# Patient Record
Sex: MALE | Race: WHITE | URBAN - METROPOLITAN AREA
[De-identification: names, ages, dates, MRNs, and addresses within clinical notes are randomized per-mention and may not be internally consistent; named-entity substitution may affect disease eponyms.]

---

## 2017-10-12 ENCOUNTER — HISTORICAL (OUTPATIENT)
Dept: ADMINISTRATIVE | Facility: HOSPITAL | Age: 82
End: 2017-10-12

## 2017-10-12 LAB
ABS NEUT (OLG): 3.73 X10(3)/MCL (ref 2.1–9.2)
ALBUMIN SERPL-MCNC: 2.1 GM/DL (ref 3.4–5)
ALBUMIN/GLOB SERPL: 1 {RATIO}
ALP SERPL-CCNC: 87 UNIT/L (ref 45–117)
ALT SERPL-CCNC: 40 UNIT/L (ref 16–61)
AST SERPL-CCNC: 41 UNIT/L (ref 15–37)
BASOPHILS # BLD AUTO: 0.02 X10(3)/MCL (ref 0–0.2)
BASOPHILS NFR BLD AUTO: 0.4 % (ref 0–0.9)
BILIRUB SERPL-MCNC: 2.1 MG/DL (ref 0.2–1)
BILIRUBIN DIRECT+TOT PNL SERPL-MCNC: 0.8 MG/DL (ref 0–1)
BILIRUBIN DIRECT+TOT PNL SERPL-MCNC: 1.3 MG/DL (ref 0–0.2)
BUN SERPL-MCNC: 20 MG/DL (ref 7–18)
CALCIUM SERPL-MCNC: 8.1 MG/DL (ref 8.5–10.1)
CHLORIDE SERPL-SCNC: 103 MMOL/L (ref 98–107)
CO2 SERPL-SCNC: 30 MMOL/L (ref 21–32)
CREAT SERPL-MCNC: 0.72 MG/DL (ref 0.7–1.3)
EOSINOPHIL # BLD AUTO: 0.07 X10(3)/MCL (ref 0–0.9)
EOSINOPHIL NFR BLD AUTO: 1.3 % (ref 0–6.5)
ERYTHROCYTE [DISTWIDTH] IN BLOOD BY AUTOMATED COUNT: 14.5 % (ref 11.5–17)
GLOBULIN SER-MCNC: 4 GM/DL (ref 2–4)
GLUCOSE SERPL-MCNC: 156 MG/DL (ref 74–106)
HCT VFR BLD AUTO: 32.2 % (ref 42–52)
HGB BLD-MCNC: 10.8 GM/DL (ref 14–18)
IMM GRANULOCYTES # BLD AUTO: 0.04 10*3/UL (ref 0–0.02)
IMM GRANULOCYTES NFR BLD AUTO: 0.7 % (ref 0–0.43)
LYMPHOCYTES # BLD AUTO: 1.02 X10(3)/MCL (ref 0.6–4.6)
LYMPHOCYTES NFR BLD AUTO: 18.4 % (ref 16.2–38.3)
MCH RBC QN AUTO: 32.6 PG (ref 27–31)
MCHC RBC AUTO-ENTMCNC: 33.5 GM/DL (ref 33–36)
MCV RBC AUTO: 97.3 FL (ref 80–94)
MONOCYTES # BLD AUTO: 0.67 X10(3)/MCL (ref 0.1–1.3)
MONOCYTES NFR BLD AUTO: 12.1 % (ref 4.7–11.3)
NEUTROPHILS # BLD AUTO: 3.73 X10(3)/MCL (ref 2.1–9.2)
NEUTROPHILS NFR BLD AUTO: 67.1 % (ref 49.1–73.4)
NRBC BLD AUTO-RTO: 0 % (ref 0–0.2)
PLATELET # BLD AUTO: 190 X10(3)/MCL (ref 130–400)
PMV BLD AUTO: 11.4 FL (ref 7.4–10.4)
POTASSIUM SERPL-SCNC: 3.4 MMOL/L (ref 3.5–5.1)
PREALB SERPL-MCNC: 8.5 MG/DL (ref 20–40)
PROT SERPL-MCNC: 6 GM/DL (ref 6.4–8.2)
RBC # BLD AUTO: 3.31 X10(6)/MCL (ref 4.7–6.1)
SODIUM SERPL-SCNC: 141 MMOL/L (ref 136–145)
WBC # SPEC AUTO: 5.6 X10(3)/MCL (ref 4.5–11.5)

## 2017-10-13 LAB
INR PPP: 1.3
PROTHROMBIN TIME: 13.8 SECOND(S) (ref 9.8–12)

## 2017-10-14 LAB
INR PPP: 1.6
PROTHROMBIN TIME: 16.3 SECOND(S) (ref 9.8–12)

## 2017-10-15 LAB
INR PPP: 2.2
PROTHROMBIN TIME: 22.5 SECOND(S) (ref 9.8–12)

## 2017-10-16 LAB
ABS NEUT (OLG): 4.23 X10(3)/MCL (ref 2.1–9.2)
ALBUMIN SERPL-MCNC: 2.2 GM/DL (ref 3.4–5)
BASOPHILS # BLD AUTO: 0.02 X10(3)/MCL (ref 0–0.2)
BASOPHILS NFR BLD AUTO: 0.3 % (ref 0–0.9)
BUN SERPL-MCNC: 19 MG/DL (ref 7–18)
CALCIUM SERPL-MCNC: 8 MG/DL (ref 8.5–10.1)
CHLORIDE SERPL-SCNC: 106 MMOL/L (ref 98–107)
CO2 SERPL-SCNC: 29 MMOL/L (ref 21–32)
CREAT SERPL-MCNC: 0.55 MG/DL (ref 0.7–1.3)
EOSINOPHIL # BLD AUTO: 0.06 X10(3)/MCL (ref 0–0.9)
EOSINOPHIL NFR BLD AUTO: 1 % (ref 0–6.5)
ERYTHROCYTE [DISTWIDTH] IN BLOOD BY AUTOMATED COUNT: 14.6 % (ref 11.5–17)
GLUCOSE SERPL-MCNC: 138 MG/DL (ref 74–106)
HCT VFR BLD AUTO: 31.1 % (ref 42–52)
HGB BLD-MCNC: 10.2 GM/DL (ref 14–18)
IMM GRANULOCYTES # BLD AUTO: 0.01 10*3/UL (ref 0–0.02)
IMM GRANULOCYTES NFR BLD AUTO: 0.2 % (ref 0–0.43)
INR PPP: 2.7
LYMPHOCYTES # BLD AUTO: 1.04 X10(3)/MCL (ref 0.6–4.6)
LYMPHOCYTES NFR BLD AUTO: 17.7 % (ref 16.2–38.3)
MCH RBC QN AUTO: 32 PG (ref 27–31)
MCHC RBC AUTO-ENTMCNC: 32.8 GM/DL (ref 33–36)
MCV RBC AUTO: 97.5 FL (ref 80–94)
MONOCYTES # BLD AUTO: 0.51 X10(3)/MCL (ref 0.1–1.3)
MONOCYTES NFR BLD AUTO: 8.7 % (ref 4.7–11.3)
NEUTROPHILS # BLD AUTO: 4.23 X10(3)/MCL (ref 2.1–9.2)
NEUTROPHILS NFR BLD AUTO: 72.1 % (ref 49.1–73.4)
NRBC BLD AUTO-RTO: 0 % (ref 0–0.2)
PLATELET # BLD AUTO: 203 X10(3)/MCL (ref 130–400)
PMV BLD AUTO: 11.4 FL (ref 7.4–10.4)
POTASSIUM SERPL-SCNC: 3.9 MMOL/L (ref 3.5–5.1)
PROT SERPL-MCNC: 5.6 GM/DL (ref 6.4–8.2)
PROTHROMBIN TIME: 28.1 SECOND(S) (ref 9.8–12)
RBC # BLD AUTO: 3.19 X10(6)/MCL (ref 4.7–6.1)
SODIUM SERPL-SCNC: 139 MMOL/L (ref 136–145)
WBC # SPEC AUTO: 5.9 X10(3)/MCL (ref 4.5–11.5)

## 2017-10-17 LAB
INR PPP: 2.7
PROTHROMBIN TIME: 27.7 SECOND(S) (ref 9.8–12)

## 2017-10-23 LAB
ABS NEUT (OLG): 3.15 X10(3)/MCL (ref 2.1–9.2)
ALBUMIN SERPL-MCNC: 2.5 GM/DL (ref 3.4–5)
BASOPHILS # BLD AUTO: 0.02 X10(3)/MCL (ref 0–0.2)
BASOPHILS NFR BLD AUTO: 0.4 % (ref 0–0.9)
BUN SERPL-MCNC: 23 MG/DL (ref 7–18)
CALCIUM SERPL-MCNC: 8.7 MG/DL (ref 8.5–10.1)
CHLORIDE SERPL-SCNC: 102 MMOL/L (ref 98–107)
CO2 SERPL-SCNC: 28 MMOL/L (ref 21–32)
CREAT SERPL-MCNC: 0.57 MG/DL (ref 0.7–1.3)
EOSINOPHIL # BLD AUTO: 0.08 X10(3)/MCL (ref 0–0.9)
EOSINOPHIL NFR BLD AUTO: 1.7 % (ref 0–6.5)
ERYTHROCYTE [DISTWIDTH] IN BLOOD BY AUTOMATED COUNT: 14.6 % (ref 11.5–17)
GLUCOSE SERPL-MCNC: 130 MG/DL (ref 74–106)
HCT VFR BLD AUTO: 34.2 % (ref 42–52)
HGB BLD-MCNC: 11.1 GM/DL (ref 14–18)
IMM GRANULOCYTES # BLD AUTO: 0.02 10*3/UL (ref 0–0.02)
IMM GRANULOCYTES NFR BLD AUTO: 0.4 % (ref 0–0.43)
LYMPHOCYTES # BLD AUTO: 0.89 X10(3)/MCL (ref 0.6–4.6)
LYMPHOCYTES NFR BLD AUTO: 18.5 % (ref 16.2–38.3)
MCH RBC QN AUTO: 32 PG (ref 27–31)
MCHC RBC AUTO-ENTMCNC: 32.5 GM/DL (ref 33–36)
MCV RBC AUTO: 98.6 FL (ref 80–94)
MONOCYTES # BLD AUTO: 0.64 X10(3)/MCL (ref 0.1–1.3)
MONOCYTES NFR BLD AUTO: 13.3 % (ref 4.7–11.3)
NEUTROPHILS # BLD AUTO: 3.15 X10(3)/MCL (ref 2.1–9.2)
NEUTROPHILS NFR BLD AUTO: 65.7 % (ref 49.1–73.4)
NRBC BLD AUTO-RTO: 0 % (ref 0–0.2)
PLATELET # BLD AUTO: 204 X10(3)/MCL (ref 130–400)
PMV BLD AUTO: 11.9 FL (ref 7.4–10.4)
POTASSIUM SERPL-SCNC: 5.1 MMOL/L (ref 3.5–5.1)
PROT SERPL-MCNC: 6.7 GM/DL (ref 6.4–8.2)
RBC # BLD AUTO: 3.47 X10(6)/MCL (ref 4.7–6.1)
SODIUM SERPL-SCNC: 136 MMOL/L (ref 136–145)
WBC # SPEC AUTO: 4.8 X10(3)/MCL (ref 4.5–11.5)

## 2017-10-25 LAB
ABS NEUT (OLG): 3.49 X10(3)/MCL (ref 2.1–9.2)
BASOPHILS # BLD AUTO: 0.02 X10(3)/MCL (ref 0–0.2)
BASOPHILS NFR BLD AUTO: 0.4 % (ref 0–0.9)
EOSINOPHIL # BLD AUTO: 0.03 X10(3)/MCL (ref 0–0.9)
EOSINOPHIL NFR BLD AUTO: 0.6 % (ref 0–6.5)
ERYTHROCYTE [DISTWIDTH] IN BLOOD BY AUTOMATED COUNT: 14.5 % (ref 11.5–17)
HCT VFR BLD AUTO: 36 % (ref 42–52)
HGB BLD-MCNC: 11.8 GM/DL (ref 14–18)
IMM GRANULOCYTES # BLD AUTO: 0.01 10*3/UL (ref 0–0.02)
IMM GRANULOCYTES NFR BLD AUTO: 0.2 % (ref 0–0.43)
LYMPHOCYTES # BLD AUTO: 1.02 X10(3)/MCL (ref 0.6–4.6)
LYMPHOCYTES NFR BLD AUTO: 19.8 % (ref 16.2–38.3)
MCH RBC QN AUTO: 31.7 PG (ref 27–31)
MCHC RBC AUTO-ENTMCNC: 32.8 GM/DL (ref 33–36)
MCV RBC AUTO: 96.8 FL (ref 80–94)
MONOCYTES # BLD AUTO: 0.58 X10(3)/MCL (ref 0.1–1.3)
MONOCYTES NFR BLD AUTO: 11.3 % (ref 4.7–11.3)
NEUTROPHILS # BLD AUTO: 3.49 X10(3)/MCL (ref 2.1–9.2)
NEUTROPHILS NFR BLD AUTO: 67.7 % (ref 49.1–73.4)
NRBC BLD AUTO-RTO: 0 % (ref 0–0.2)
PLATELET # BLD AUTO: 279 X10(3)/MCL (ref 130–400)
PMV BLD AUTO: 10.9 FL (ref 7.4–10.4)
RBC # BLD AUTO: 3.72 X10(6)/MCL (ref 4.7–6.1)
WBC # SPEC AUTO: 5.2 X10(3)/MCL (ref 4.5–11.5)

## 2017-10-30 LAB
ABS NEUT (OLG): 4.14 X10(3)/MCL (ref 2.1–9.2)
ALBUMIN SERPL-MCNC: 2.7 GM/DL (ref 3.4–5)
BASOPHILS # BLD AUTO: 0.02 X10(3)/MCL (ref 0–0.2)
BASOPHILS NFR BLD AUTO: 0.3 % (ref 0–0.9)
BUN SERPL-MCNC: 24 MG/DL (ref 7–18)
CALCIUM SERPL-MCNC: 8.6 MG/DL (ref 8.5–10.1)
CHLORIDE SERPL-SCNC: 101 MMOL/L (ref 98–107)
CO2 SERPL-SCNC: 27 MMOL/L (ref 21–32)
CREAT SERPL-MCNC: 0.63 MG/DL (ref 0.7–1.3)
EOSINOPHIL # BLD AUTO: 0.03 X10(3)/MCL (ref 0–0.9)
EOSINOPHIL NFR BLD AUTO: 0.5 % (ref 0–6.5)
ERYTHROCYTE [DISTWIDTH] IN BLOOD BY AUTOMATED COUNT: 14.3 % (ref 11.5–17)
GLUCOSE SERPL-MCNC: 145 MG/DL (ref 74–106)
HCT VFR BLD AUTO: 34.6 % (ref 42–52)
HGB BLD-MCNC: 11.6 GM/DL (ref 14–18)
IMM GRANULOCYTES # BLD AUTO: 0.01 10*3/UL (ref 0–0.02)
IMM GRANULOCYTES NFR BLD AUTO: 0.2 % (ref 0–0.43)
LYMPHOCYTES # BLD AUTO: 1.13 X10(3)/MCL (ref 0.6–4.6)
LYMPHOCYTES NFR BLD AUTO: 18.3 % (ref 16.2–38.3)
MCH RBC QN AUTO: 32.2 PG (ref 27–31)
MCHC RBC AUTO-ENTMCNC: 33.5 GM/DL (ref 33–36)
MCV RBC AUTO: 96.1 FL (ref 80–94)
MONOCYTES # BLD AUTO: 0.83 X10(3)/MCL (ref 0.1–1.3)
MONOCYTES NFR BLD AUTO: 13.5 % (ref 4.7–11.3)
NEUTROPHILS # BLD AUTO: 4.14 X10(3)/MCL (ref 2.1–9.2)
NEUTROPHILS NFR BLD AUTO: 67.2 % (ref 49.1–73.4)
NRBC BLD AUTO-RTO: 0 % (ref 0–0.2)
PLATELET # BLD AUTO: 216 X10(3)/MCL (ref 130–400)
PMV BLD AUTO: 11.4 FL (ref 7.4–10.4)
POTASSIUM SERPL-SCNC: 4.4 MMOL/L (ref 3.5–5.1)
PROT SERPL-MCNC: 6.7 GM/DL (ref 6.4–8.2)
RBC # BLD AUTO: 3.6 X10(6)/MCL (ref 4.7–6.1)
SODIUM SERPL-SCNC: 134 MMOL/L (ref 136–145)
WBC # SPEC AUTO: 6.2 X10(3)/MCL (ref 4.5–11.5)

## 2017-11-06 LAB
ABS NEUT (OLG): 5.22 X10(3)/MCL (ref 2.1–9.2)
ALBUMIN SERPL-MCNC: 2.3 GM/DL (ref 3.4–5)
BASOPHILS # BLD AUTO: 0.02 X10(3)/MCL (ref 0–0.2)
BASOPHILS NFR BLD AUTO: 0.3 % (ref 0–0.9)
BUN SERPL-MCNC: 22 MG/DL (ref 7–18)
CALCIUM SERPL-MCNC: 8.6 MG/DL (ref 8.5–10.1)
CHLORIDE SERPL-SCNC: 99 MMOL/L (ref 98–107)
CO2 SERPL-SCNC: 29 MMOL/L (ref 21–32)
CREAT SERPL-MCNC: 0.6 MG/DL (ref 0.7–1.3)
EOSINOPHIL # BLD AUTO: 0.03 X10(3)/MCL (ref 0–0.9)
EOSINOPHIL NFR BLD AUTO: 0.4 % (ref 0–6.5)
ERYTHROCYTE [DISTWIDTH] IN BLOOD BY AUTOMATED COUNT: 14.8 % (ref 11.5–17)
GLUCOSE SERPL-MCNC: 107 MG/DL (ref 74–106)
HCT VFR BLD AUTO: 33.9 % (ref 42–52)
HGB BLD-MCNC: 11.2 GM/DL (ref 14–18)
IMM GRANULOCYTES # BLD AUTO: 0.03 10*3/UL (ref 0–0.02)
IMM GRANULOCYTES NFR BLD AUTO: 0.4 % (ref 0–0.43)
LYMPHOCYTES # BLD AUTO: 1.07 X10(3)/MCL (ref 0.6–4.6)
LYMPHOCYTES NFR BLD AUTO: 14.7 % (ref 16.2–38.3)
MCH RBC QN AUTO: 31.4 PG (ref 27–31)
MCHC RBC AUTO-ENTMCNC: 33 GM/DL (ref 33–36)
MCV RBC AUTO: 95 FL (ref 80–94)
MONOCYTES # BLD AUTO: 0.92 X10(3)/MCL (ref 0.1–1.3)
MONOCYTES NFR BLD AUTO: 12.6 % (ref 4.7–11.3)
NEUTROPHILS # BLD AUTO: 5.22 X10(3)/MCL (ref 2.1–9.2)
NEUTROPHILS NFR BLD AUTO: 71.6 % (ref 49.1–73.4)
NRBC BLD AUTO-RTO: 0 % (ref 0–0.2)
PLATELET # BLD AUTO: 226 X10(3)/MCL (ref 130–400)
PMV BLD AUTO: 11.5 FL (ref 7.4–10.4)
POTASSIUM SERPL-SCNC: 4.5 MMOL/L (ref 3.5–5.1)
PROT SERPL-MCNC: 6.7 GM/DL (ref 6.4–8.2)
RBC # BLD AUTO: 3.57 X10(6)/MCL (ref 4.7–6.1)
SODIUM SERPL-SCNC: 135 MMOL/L (ref 136–145)
WBC # SPEC AUTO: 7.3 X10(3)/MCL (ref 4.5–11.5)

## 2017-11-13 LAB
ABS NEUT (OLG): 3.8 X10(3)/MCL (ref 2.1–9.2)
ALBUMIN SERPL-MCNC: 2.5 GM/DL (ref 3.4–5)
BASOPHILS # BLD AUTO: 0.02 X10(3)/MCL (ref 0–0.2)
BASOPHILS NFR BLD AUTO: 0.3 % (ref 0–0.9)
BUN SERPL-MCNC: 19 MG/DL (ref 7–18)
CALCIUM SERPL-MCNC: 8.7 MG/DL (ref 8.5–10.1)
CHLORIDE SERPL-SCNC: 104 MMOL/L (ref 98–107)
CO2 SERPL-SCNC: 26 MMOL/L (ref 21–32)
CREAT SERPL-MCNC: 0.57 MG/DL (ref 0.7–1.3)
EOSINOPHIL # BLD AUTO: 0.05 X10(3)/MCL (ref 0–0.9)
EOSINOPHIL NFR BLD AUTO: 0.8 % (ref 0–6.5)
ERYTHROCYTE [DISTWIDTH] IN BLOOD BY AUTOMATED COUNT: 14.6 % (ref 11.5–17)
GLUCOSE SERPL-MCNC: 114 MG/DL (ref 74–106)
HCT VFR BLD AUTO: 34.7 % (ref 42–52)
HGB BLD-MCNC: 11.5 GM/DL (ref 14–18)
IMM GRANULOCYTES # BLD AUTO: 0.02 10*3/UL (ref 0–0.02)
IMM GRANULOCYTES NFR BLD AUTO: 0.3 % (ref 0–0.43)
LYMPHOCYTES # BLD AUTO: 1.51 X10(3)/MCL (ref 0.6–4.6)
LYMPHOCYTES NFR BLD AUTO: 25.5 % (ref 16.2–38.3)
MCH RBC QN AUTO: 30.7 PG (ref 27–31)
MCHC RBC AUTO-ENTMCNC: 33.1 GM/DL (ref 33–36)
MCV RBC AUTO: 92.5 FL (ref 80–94)
MONOCYTES # BLD AUTO: 0.52 X10(3)/MCL (ref 0.1–1.3)
MONOCYTES NFR BLD AUTO: 8.8 % (ref 4.7–11.3)
NEUTROPHILS # BLD AUTO: 3.8 X10(3)/MCL (ref 2.1–9.2)
NEUTROPHILS NFR BLD AUTO: 64.3 % (ref 49.1–73.4)
NRBC BLD AUTO-RTO: 0 % (ref 0–0.2)
PLATELET # BLD AUTO: 246 X10(3)/MCL (ref 130–400)
PMV BLD AUTO: 10.9 FL (ref 7.4–10.4)
POTASSIUM SERPL-SCNC: 4.2 MMOL/L (ref 3.5–5.1)
PROT SERPL-MCNC: 7 GM/DL (ref 6.4–8.2)
RBC # BLD AUTO: 3.75 X10(6)/MCL (ref 4.7–6.1)
SODIUM SERPL-SCNC: 137 MMOL/L (ref 136–145)
WBC # SPEC AUTO: 5.9 X10(3)/MCL (ref 4.5–11.5)

## 2022-04-10 ENCOUNTER — HISTORICAL (OUTPATIENT)
Dept: ADMINISTRATIVE | Facility: HOSPITAL | Age: 87
End: 2022-04-10

## 2022-04-26 VITALS
SYSTOLIC BLOOD PRESSURE: 120 MMHG | DIASTOLIC BLOOD PRESSURE: 73 MMHG | BODY MASS INDEX: 22.85 KG/M2 | HEIGHT: 73 IN | WEIGHT: 172.38 LBS

## 2022-05-03 NOTE — HISTORICAL [facility] CERNER
This is a historical note converted from Tan. Formatting and pictures may have been removed.  Please reference Tan for original formatting and attached multimedia. Chief Complaint  transfer for Rehab  History of Present Illness  89-year-old white who suffered a fall on 9/30/2017 from a standing position.? He states that he had a syncopal episode prior to the fall.? He does not remember anything prior to the fall however when he hit his head after the fall he states that he was awoken and screamed for help.? Wife nearby came to help.? Cannot tell me if he had any presyncopal symptoms.? MRI in the ED noted epidural hematoma from C1-C4.? Severe spinal stenosis at C4-C5 secondary to spondylosis.? He also had posterior soft tissue edema concerning for ligament damage.? Patient does not wish to proceed with surgical induration on day of admit and was observed however his motor exam continued to decline and patient was reluctant to have surgery however he wished to speak his family.? Neurosurgery had an extensive conversation with the daughter who is a physician and at that point it was decided to proceed with surgical decompression.? Procedures performed were C-4 laminectomy with facetectomy, C4-5 laminectomy with medial facetectomy and evacuation of epidural hematoma, C5-6-7 laminectomy with medial facetectomy.? C3, C4, C5, C6, C7 posterior segmental fixation, posterior lateral arthrodesis.? Morcellized autograft, allograft.? Upon arrival to rehabilitation today he has a C-spine collar in place.? Denies having any chest pain, shortness of breath, abdominal pains, nausea, vomiting.? He states that prior to the fall he was completely independent including all ADLs and took care of his wife.? He states that prior to the fall he did not have any urinary incontinence however has had urinary incontinence after the fall and has required a condom catheter.? Does not have any bowel incontinence.? Per family at bedside he  worked with physical therapy at the transferring facility and was only able to walk 2 steps. ?He initially failed?study?and was kept nothing by mouth?however?2 days ago he?passed and?was started on?oral diet.? Transferred to?[facility]?for?rehabilitation.?  Review of Systems  Gen: AOx3,?No fever, No weight changes  Eye: No blindness  Heart: No chest pains, No palpitations  Lungs: No SOB, No wheezing  GI: No abd pain, No Nausea, No vomiting  : No hematuria, No dysuria, Urinary incontinence  Musk: No LE swelling, generalized weakness in all extremities  Integumentary: brusing present  Neuro: Normal speech, headache  Physical Exam  Vitals & Measurements  WT:?83.6?kg?  T 37, P 91, RR 16, SPO2 97% on NC, /69  Gen: AOx3, No acute distress, Afebrile  Head: Normocephalic, C spine collar in place  Eye: Normal conjuctiva, No scleral icterus, 2-3mm pupils reactive to light, horizontal nystagmus present  Heart: normal rate, irregular rythm, No M/G/R  Lungs: CTAB  GI: No abd tenderness, No rebound tenderness, BS+  Musk: No LE edema, Normal LE ROM  Neuro: EOMI, CN 2-12 grossly intact, Normal sensation, UE and LE bilaterally able to lift against gravity minimally however unable to keep elevated, 3/5 in UE b/l bicep and tricep, 4/5 in LE b/l with flexion and extension at knee, 5/5 plantarflexion and dorsiflexion  Integumentary: brusing present over right hip and left shoulder, no noticable skin break down present on back  Assessment/Plan  Impaired mobility  Cervical radiculopathy with weakness  Cervical epidural hematoma  Cervical spinal stenosis  Chronic atrial fibrillation  CAD  Hypothyroidism  Hypertension  DM  ?  To be evaluated by PT/OT/RT/ST  Continue Lovenox with bridge over to Coumadin once INR 2.0  Continue metoprolol 200, atorvastatin  Continue Synthroid  Continue other home medications for chronic problems  Encourage increased oral intake  ?  DVT ppx: AC for A fib already   Problem List/Past Medical  History  Ongoing  Back pain  Colon cancer  Hard of hearing  Hyperlipidaemia  Hypertension  Historical  Diabetes  Heart disease  Irregular heart beat  Sleep apnea  Procedure/Surgical History  Posterior Cervical Fusion (.) (10/05/2017)  Stent placement (08/2013)  Appendectomy (1930)  Colectomy  Colon  Colonoscopy  Creation of pericardial window  Tonsillectomy  Medications  Inpatient  Ambien, 5 mg= 1 tab(s), Oral, qPM, PRN  Amino Acids 4.25%-D5% (Clinimix Sulfite-Free) 2,000 mL, 2000 mL, IV  Ativan, 2 mg= 1 mL, IV Push, q4hr, PRN  atorvastatin 40 mg oral tablet, 40 mg= 1 tab(s), Oral, Daily  Chloraseptic mucous membrane lozenge, 1 lozenge(s), Oral, q1hr, PRN  Compazine, 5 mg= 1 mL, IV Push, q4hr, PRN  docusate 10 mg/mL oral liquid, 100 mg= 10 mL, Oral, Once a day (at bedtime)  Dulcolax Laxative 10 mg RECTAL suppository, 10 mg= 1 supp, VA (rectal), Daily, PRN  finasteride, 5 mg= 1 tab(s), Oral, At Bedtime  glucagon, 1 mg= 1 EA, IM, q10min, PRN  insulin lispro, 2-14 units, Subcutaneous, As Directed, PRN  Intralipid 20% IVPB, 50 gm= 250 mL, IV Piggyback, q24hr  labetalol, 10 mg= 2 mL, IV Push, q2hr, PRN  levothyroxine 50 mcg (0.05 mg) oral tablet, 50 mcg= 1 tab(s), Oral, Daily  Lovenox, 80 mg= 0.8 mL, Subcutaneous, BID  metFORMIN, 1000 mg= 2 tab(s), Oral, BID  Metoprolol Succinate ER 50 mg oral tablet, extended release, 100 mg= 2 tab(s), Oral, At Bedtime  morphine, 2 mg= 0.5 mL, IV, q2hr, PRN  Norco 10 mg-325 mg oral tablet, 1 tab(s), Oral, q6hr, PRN  Norco 5 mg-325 mg oral tablet, 1 tab(s), Oral, q6hr, PRN  Percocet 10/325, 1 tab(s), Oral, q6hr, PRN  phenol 1.4% topical spray, 1 spray(s), TOP, q1hr, PRN  Protonix, 40 mg= 1 tab(s), Oral, Daily  Robaxin, 500 mg= 1 tab(s), Oral, q8hr, PRN  simethicone 40 mg/0.6 mL oral liquid ( Mylicon ), 40 mg= 0.6 mL, Oral, q6hr, PRN  spironolactone, 25 mg= 1 tab(s), Oral, Daily  Tylenol, 650 mg= 2 tab(s), Oral, q4hr, PRN  Valium, 10 mg= 1 tab(s), Oral, q6hr, PRN  Vasotec, 1.25 mg= 1 mL,  IV Push, q6hr, PRN  Vitamin B12 500 mcg oral tablet, 500 mcg= 1 tab(s), Oral, Daily  Vitamin D3, 5000 units= 5 tab(s), Oral, Daily  warfarin, 5 mg= 1 tab(s), Oral, At Bedtime  Zofran, 4 mg= 2 mL, IV Push, q6hr, PRN  Zofran, 4 mg= 2 mL, IV Push, q6hr, PRN  Home  atorvastatin 80 mg oral tablet, 1/2 tab(s), Oral, Daily  Centrum Silver oral tablet, 1 tab(s), Oral, Daily  finasteride 5 mg oral tablet, 5 mg= 1 tab(s), Oral, At Bedtime  levothyroxine 50 mcg (0.05 mg) oral tablet, 50 mcg= 1 tab(s), Oral, Daily  Lovenox, 80 mg= 0.8 mL, Subcutaneous, BID  metFORMIN 1000 mg oral tablet, 1000 mg= 1 tab(s), Oral, BID  Protonix 40 mg ORAL enteric coated tablet, 40 mg= 1 tab(s), Oral, Daily  spironolactone 25 mg oral tablet, 25 mg= 1 tab(s), Oral, Daily  Vitamin B12 500 mcg oral tablet, 500 mcg= 1 tab(s), Oral, Daily  Vitamin D3 5000 intl units oral tablet, 5000 IntUnit= 1 tab(s), Oral, Daily  warfarin 5 mg oral tablet, 5 mg= 1 tab(s), Oral, At Bedtime  Allergies  No Known Allergies  Social History  Alcohol - 01/03/2017  Current, Beer, 3-5 times per week  Employment/School - 01/03/2017  Retired  Home/Environment - 01/03/2017  Lives with Spouse. Living situation: Home/Independent.  Tobacco - 01/03/2017  Never smoker  Family History  Acute myocardial infarction.: Brother.  Cancer - unknown origin: Son.  Primary malignant neoplasm of colon: Father.      dos 10/12/17  I have performed a history and physical examination of the patient and discussed the management with the resident.  ???  [ ] I reviewed the residents note and agree with the documented findings and plan of care.  I have evaluated the patient  I have reviewed old records  I have reviewed?medical reconciliation and admit orders  I have discussed the case with nurse practitioner and staff  I have completed?the?MOLLY  I am in agreement with the initial plan of care  MEDICAL PLAN:  -Admit to [facility] unit  -Medical reconciliation completed and  included in the electronic health record  -Draw admit labs including CBC, CMP, and urinalysis(if indicated)  -Initiate DVT/PTE prophylaxis both mechanically and chemically  -Maintain weightbearing status as ordered  -Monitor postoperative surgical incision changing dressing daily  -Teach skin protection and wound prevention techniques  -Initiate fall precaution  -Initiate bowel training program  -Initiate bladder training as indicated  -Consult?Neurosurgery postoperatively if needed  -Consult Dr Stallings for medical management if needed  -Pain management  -IS q 2 hours while awake  THERAPEUTIC PLAN:  -Physical therapy 60-90 minutes 5 to week to increase functional mobility, maintain weightbearing precautions, increase lower extremity restrengthening, increase overall activity tolerance, teach balance and safety measures as well as fall precautions, make equipment and orthotic recommendations, be involved in family training and discharge planning, monitor pain levels and vital signs during therapy adjusting treatment accordingly  -Occupational therapy 60-90 minutes 5 times a week to increase functional independence with activities of daily living, maintain weightbearing precautions, teach use of adaptive equipment, increase upper extremity restrengthening, increase overall activity tolerance, teach balance and safety measures as well as fall precautions, make equipment and orthotic recommendations, be involved in family training and discharge planning, monitor pain levels and vital signs during therapy adjusting treatment accordingly  -Speech and language pathology will do an in-depth evaluation of patients cognition, phonation, and swallowing. They will initiate therapy 30- 60 minutes 5 times a week as indicated  -Recreational therapy will incorporate all patients functional abilities into recreational activities that will require visual, spatial, and perceptual abilities; gross and fine motor coordination skills; the  cognition to follow multistep commands; dynamic and static balance and reaching abilities. They will also incorporate animal assisted therapy into their weekly program  -Rehabilitation nursing will act as patient family physician liaison. They will integrate patients functional abilities, after discussions with therapist, into everyday activities with the CNAs, LPNs and RNs that will include but are not limited to dressing, bathing, feeding, grooming, toileting, transfers, hygiene etc. They will administer medications watching for wanted as well as unwanted effects. They will initiate DVT/VTE prophylaxis as ordered. Will monitor vital signs, lab values, and pain levels, making appropriate physician notification. They will monitor skin integrity including postop incision, making daily dressing changes. They will teach skin protection and wound prevention techniques. They will initiate bowel training They will initiate bladder training as indicated. They will initiate fall precautions  -Rehabilitation counseling/case management will act as patient and family liaison. They will set up family conferences, family training, aid in discharge planning, and aid in the procurement of assistive devices  -Patient will have 24 hour availability to physiatric care  -Patient will have nutritional services involved, monitoring oral input and visceral protein stores. They will make dietary and supplement recommendations and follow-up as necessary  -Patient will have weekly interdisciplinary team conferences  -Patient will have initial family conference and follow up conferences as indicated  -Patient will have family training prior to discharge  Estimated length of stay-17-24 days  Anticipated discharge destination-TBD at family conference; had been helping care for wife at home previously  Medical status-stabilizing postoperatively; will need to monitor pain levels, postoperative skin integrity, watch for evidence of deep vein  thrombosis, monitor postoperative H&H, monitor vital signs closely.  Medical prognosis-fair  Previous functional Staus-was (I) prior to fall  Present Functional Status-mod-max (A)  PHYSICIAN ATTESTATION: I have been involved in the patients rehabilitation prescreening process and I have now completed the post admission physician evaluation. . Patient is in need of, appropriate for, and should tolerate the above outlined inpatient rehabilitation plan. I believe this is necessary to reach maximal postoperative healing and maximal functional abilities. I do not believe this would be possible in a timely fashion in a less intensive setting  ?  ?